# Patient Record
Sex: MALE | Race: BLACK OR AFRICAN AMERICAN | NOT HISPANIC OR LATINO | Employment: FULL TIME | URBAN - METROPOLITAN AREA
[De-identification: names, ages, dates, MRNs, and addresses within clinical notes are randomized per-mention and may not be internally consistent; named-entity substitution may affect disease eponyms.]

---

## 2018-03-05 ENCOUNTER — HOSPITAL ENCOUNTER (EMERGENCY)
Facility: CLINIC | Age: 33
Discharge: HOME | End: 2018-03-05
Attending: EMERGENCY MEDICINE | Admitting: EMERGENCY MEDICINE
Payer: COMMERCIAL

## 2018-03-05 ENCOUNTER — APPOINTMENT (OUTPATIENT)
Dept: GENERAL RADIOLOGY | Facility: CLINIC | Age: 33
End: 2018-03-05
Attending: EMERGENCY MEDICINE
Payer: COMMERCIAL

## 2018-03-05 VITALS
BODY MASS INDEX: 18.97 KG/M2 | TEMPERATURE: 96.6 F | SYSTOLIC BLOOD PRESSURE: 125 MMHG | OXYGEN SATURATION: 99 % | WEIGHT: 139.9 LBS | RESPIRATION RATE: 18 BRPM | DIASTOLIC BLOOD PRESSURE: 55 MMHG

## 2018-03-05 DIAGNOSIS — M79.671 RIGHT FOOT PAIN: ICD-10-CM

## 2018-03-05 PROCEDURE — 99283 EMERGENCY DEPT VISIT LOW MDM: CPT | Mod: Z6 | Performed by: EMERGENCY MEDICINE

## 2018-03-05 PROCEDURE — 25000132 ZZH RX MED GY IP 250 OP 250 PS 637: Performed by: EMERGENCY MEDICINE

## 2018-03-05 PROCEDURE — 99282 EMERGENCY DEPT VISIT SF MDM: CPT | Performed by: EMERGENCY MEDICINE

## 2018-03-05 PROCEDURE — 73630 X-RAY EXAM OF FOOT: CPT | Mod: RT

## 2018-03-05 RX ORDER — NAPROXEN 500 MG/1
500 TABLET ORAL 2 TIMES DAILY WITH MEALS
Qty: 16 TABLET | Refills: 0 | Status: SHIPPED | OUTPATIENT
Start: 2018-03-05 | End: 2018-03-13

## 2018-03-05 RX ORDER — IBUPROFEN 200 MG
400 TABLET ORAL ONCE
Status: COMPLETED | OUTPATIENT
Start: 2018-03-05 | End: 2018-03-05

## 2018-03-05 RX ADMIN — IBUPROFEN 400 MG: 200 TABLET, FILM COATED ORAL at 15:16

## 2018-03-05 ASSESSMENT — ENCOUNTER SYMPTOMS
WEAKNESS: 0
JOINT SWELLING: 0
FEVER: 0
NUMBNESS: 0
SHORTNESS OF BREATH: 0
ARTHRALGIAS: 0
WOUND: 0
COLOR CHANGE: 0
CHILLS: 0
ABDOMINAL PAIN: 0
MYALGIAS: 0

## 2018-03-05 NOTE — DISCHARGE INSTRUCTIONS
Please make an appointment to follow up with Orthopedics (phone: [phone]) in 3-5 days for further evaluation and recommendations, unless your symptoms completely resolve.  If your symptoms significantly worsen please come back to the emergency department for evaluation.

## 2018-03-05 NOTE — ED AVS SNAPSHOT
[facility], [place], Emergency Department    [address] [place] [address]    Phone:  [phone]    Fax:  [phone]                                       Brianda Escalante   MRN: [id number]    Department:  [facility], Emergency Department   Date of Visit:  3/5/2018           After Visit Summary Signature Page     I have received my discharge instructions, and my questions have been answered. I have discussed any challenges I see with this plan with the nurse or doctor.    ..........................................................................................................................................  Patient/Patient Representative Signature      ..........................................................................................................................................  Patient Representative Print Name and Relationship to Patient    ..................................................               ................................................  Date                                            Time    ..........................................................................................................................................  Reviewed by Signature/Title    ...................................................              ..............................................  Date                                                            Time

## 2018-08-17 NOTE — ED PROVIDER NOTES
History     Chief Complaint   Patient presents with     Leg Pain     Noticed small swelling on R foot near middle toes on Saturday. Reports mild painl, worse with weight bearing, and tender to touch.      SHERRIE Escalante is a 32 year old male who presents for evaluation of right foot pain. The patient reports that last night he developed pain in the right forefoot in the region of the 2nd and 3rd toes.  Pain is mild, throbbing, nonradiating, constant, walking makes it worse, rest improves it.  He has noted some swelling in this area of his foot.  No fevers or skin color changes in right foot.  The patient denies any injury or trauma to the foot. He states that he's had no prior injury to the foot. He is otherwise healthy. No other concerns or complaints.    No current facility-administered medications for this encounter.      Current Outpatient Prescriptions   Medication     naproxen (NAPROSYN) 500 MG tablet     hydrOXYzine (VISTARIL) 50 MG capsule     ibuprofen (ADVIL,MOTRIN) 200 MG tablet     History reviewed. No pertinent past medical history.    History reviewed. No pertinent surgical history.    No family history on file.    Social History   Substance Use Topics     Smoking status: Never Smoker     Smokeless tobacco: Never Used     Alcohol use No     Allergies   Allergen Reactions     Albumin, Egg Itching     Banana Itching     Seasonal Allergies        I have reviewed the Medications, Allergies, Past Medical and Surgical History, and Social History in the Epic system.    Review of Systems   Constitutional: Negative for chills and fever.   Respiratory: Negative for shortness of breath.    Cardiovascular: Negative for chest pain.   Gastrointestinal: Negative for abdominal pain.   Musculoskeletal: Negative for arthralgias, gait problem, joint swelling and myalgias.        Right foot pain.   Skin: Negative for color change and wound.   Allergic/Immunologic: Negative for immunocompromised state.   Neurological:  Negative for weakness and numbness.   All other systems reviewed and are negative.      Physical Exam   BP: 125/55  Heart Rate: 73  Temp: 96.6  F (35.9  C)  Resp: 18  Weight: 63.5 kg (139 lb 14.4 oz)  SpO2: 99 %      Physical Exam   Constitutional: He is oriented to person, place, and time. He appears well-developed and well-nourished. No distress.   HENT:   Head: Normocephalic and atraumatic.   Eyes: Conjunctivae and EOM are normal.   Neck: Normal range of motion.   Cardiovascular: Normal rate.    Pulses:       Dorsalis pedis pulses are 2+ on the right side, and 2+ on the left side.        Posterior tibial pulses are 2+ on the right side, and 2+ on the left side.   Pulmonary/Chest: Effort normal. No respiratory distress.   Musculoskeletal: Normal range of motion. He exhibits edema and tenderness. He exhibits no deformity.   Mild point tenderness over right, dorsal foot just proximal to second and third MTP joints.  There is very mild edema in the dorsum of the foot over the affected area.  No obvious deformity.  No tenderness over toes in the right foot.  No right midfoot tenderness.  No right ankle tenderness.  Full range of motion, active and passive, in right toes, ankle, knee, and hip without pain.  No discoloration of right foot.   Neurological: He is alert and oriented to person, place, and time. No cranial nerve deficit. He exhibits normal muscle tone. Coordination normal.   Skin: Skin is warm. No rash noted. He is not diaphoretic. No erythema.   Psychiatric: He has a normal mood and affect. His behavior is normal. Judgment and thought content normal.   Nursing note and vitals reviewed.      ED Course     ED Course     Procedures             Critical Care time:  none             Labs Ordered and Resulted from Time of ED Arrival Up to the Time of Departure from the ED - No data to display         Assessments & Plan (with Medical Decision Making)   32-year-old man presenting with right foot pain. Differential  diagnosis: sprain, fracture, contusion.     After a thorough history and physical exam the patient appears to be in no acute distress. He will be given oral ibuprofen for pain and I will obtain x-ray of the right foot for further diagnostic evaluation. He agrees with the plan.     I reviewed the patient's x-ray and I read the radiology report; there s no evidence of acute abnormality such as dislocation or fracture on the x-ray. The patient was provided with a postop shoe for comfort and a prescription for Naproxen with referral to Podiatry, and recommendations to return to the Emergency Department if his symptoms worsen. He agrees with the plan. At this point he is stable for discharge.  Gait is normal at discharge.      This part of the document was transcribed by Xiang Serra for Yony Flores MD.    I have reviewed the nursing notes.    I have reviewed the findings, diagnosis, plan and need for follow up with the patient.    Discharge Medication List as of 3/5/2018  3:44 PM      START taking these medications    Details   naproxen (NAPROSYN) 500 MG tablet Take 1 tablet (500 mg) by mouth 2 times daily (with meals) for 8 days, Disp-16 tablet, R-0, Local Print             Final diagnoses:   Right foot pain     IXiang, am serving as a trained medical scribe to document services personally performed by Yony Flores MD, based on the provider's statements to me.      Yony GARDNER MD, was physically present and have reviewed and verified the accuracy of this note documented by Xiang Serra.    3/5/2018   [facility], [place], EMERGENCY DEPARTMENT     Yony Flores MD  03/05/18 3590     No

## 2020-12-12 ENCOUNTER — HOSPITAL ENCOUNTER (EMERGENCY)
Facility: CLINIC | Age: 35
Discharge: HOME | End: 2020-12-12
Attending: EMERGENCY MEDICINE | Admitting: EMERGENCY MEDICINE
Payer: COMMERCIAL

## 2020-12-12 VITALS
HEART RATE: 69 BPM | TEMPERATURE: 47.7 F | RESPIRATION RATE: 16 BRPM | OXYGEN SATURATION: 97 % | WEIGHT: 155 LBS | SYSTOLIC BLOOD PRESSURE: 109 MMHG | BODY MASS INDEX: 23.49 KG/M2 | HEIGHT: 68 IN | DIASTOLIC BLOOD PRESSURE: 79 MMHG

## 2020-12-12 DIAGNOSIS — K13.79 BLEEDING IN MOUTH: ICD-10-CM

## 2020-12-12 DIAGNOSIS — K91.840 HEMORRHAGE FOLLOWING LIVER BIOPSY: ICD-10-CM

## 2020-12-12 DIAGNOSIS — Z98.818 STATUS POST WISDOM TOOTH EXTRACTION: ICD-10-CM

## 2020-12-12 PROCEDURE — 99283 EMERGENCY DEPT VISIT LOW MDM: CPT | Mod: 25 | Performed by: EMERGENCY MEDICINE

## 2020-12-12 PROCEDURE — 99283 EMERGENCY DEPT VISIT LOW MDM: CPT | Mod: 25

## 2020-12-12 PROCEDURE — D9930: HCPCS

## 2020-12-12 PROCEDURE — 250N000009 HC RX 250: Performed by: EMERGENCY MEDICINE

## 2020-12-12 PROCEDURE — D9930: HCPCS | Performed by: EMERGENCY MEDICINE

## 2020-12-12 RX ORDER — TRANEXAMIC ACID 100 MG/ML
INJECTION, SOLUTION INTRAVENOUS ONCE
Status: COMPLETED | OUTPATIENT
Start: 2020-12-12 | End: 2020-12-12

## 2020-12-12 RX ORDER — AMOXICILLIN 500 MG/1
500 CAPSULE ORAL 2 TIMES DAILY
COMMUNITY

## 2020-12-12 RX ADMIN — THROMBIN, TOPICAL (BOVINE) 5000 UNITS: KIT at 08:20

## 2020-12-12 RX ADMIN — TRANEXAMIC ACID 1000 MG: 100 INJECTION, SOLUTION INTRAVENOUS at 07:31

## 2020-12-12 ASSESSMENT — MIFFLIN-ST. JEOR: SCORE: 1612.58

## 2020-12-12 NOTE — DISCHARGE INSTRUCTIONS
Please call your dentist on Monday for follow up. Return to the emergency department if bleeding continues.

## 2020-12-12 NOTE — ED PROVIDER NOTES
"    [facility] EMERGENCY DEPARTMENT ([facility])     December 12, 2020    ED Provider Note  [facility]      History     Chief Complaint   Patient presents with     Mouth/Lip Problem     patient had a dental extraction 4 days ago, last night patient noticed constant bleeding on the surgical site since 11 PM     HPI  Brianda Escalante is a 35 year old male who presents for mouth bleeding. Patient reports that he had a wisdom tooth extraction 4 days ago, uncomplicated. Last night, he noticed increased constant bleeding from the extraction site. Bleeding has persisted throughout the night. He has not been able to contact his dentist due to the time of day. Denies history of bleeding disorder. No other bruising or bleeding noted. No trauma.     Past Medical History  History reviewed. No pertinent past medical history.  History reviewed. No pertinent surgical history.       amoxicillin (AMOXIL) 500 MG capsule       ibuprofen (ADVIL,MOTRIN) 200 MG tablet       hydrOXYzine (VISTARIL) 50 MG capsule      Allergies   Allergen Reactions     Albumin, Egg Itching     Banana Itching     Seasonal Allergies      Family History  History reviewed. No pertinent family history.  Social History   Social History     Tobacco Use     Smoking status: Never Smoker     Smokeless tobacco: Never Used   Substance Use Topics     Alcohol use: No     Drug use: No      Past medical history, past surgical history, medications, allergies, family history, and social history were reviewed with the patient. No additional pertinent items.       Review of Systems  A complete review of systems was performed with pertinent positives and negatives noted in the HPI, and all other systems negative.    Physical Exam   BP: 113/83  Pulse: 77  Temp: 98.2  F (36.8  C)  Resp: 18  Height: 172.7 cm (5' 8\")  Weight: 70.3 kg (155 lb)  SpO2: 99 %  Physical Exam  Vitals signs and nursing note reviewed.   Constitutional:       General: He is not in " acute distress.     Appearance: Normal appearance. He is not diaphoretic.   HENT:      Head: Normocephalic and atraumatic.      Mouth/Throat:     Eyes:      General: No scleral icterus.     Pupils: Pupils are equal, round, and reactive to light.   Neck:      Musculoskeletal: Neck supple.   Cardiovascular:      Rate and Rhythm: Normal rate.   Pulmonary:      Effort: Pulmonary effort is normal. No respiratory distress.   Abdominal:      General: Bowel sounds are normal.      Palpations: Abdomen is soft.      Tenderness: There is no abdominal tenderness.   Musculoskeletal:         General: No tenderness.   Skin:     General: Skin is warm.      Findings: No rash.   Neurological:      General: No focal deficit present.      Mental Status: He is alert and oriented to person, place, and time.   Psychiatric:         Mood and Affect: Mood normal.         Behavior: Behavior normal.         ED Course      Procedures       No results found for any visits on 12/12/20.  Medications   tranexamic acid (CYKLOKAPRON) TOPICAL (injection used topically) (1,000 mg Topical Given by Other Clinician 12/12/20 5096)   thrombin 5000 units EPISTAXIS kit (5,000 Units Topical Given by Other Clinician 12/12/20 8996)        Assessments & Plan (with Medical Decision Making)   Patient was seen and examined.  Initial trial of topical TXA was unsuccessful at stopping the bleeding.  Surgicel was soaked in topical thrombin and packed into the area of bleeding.  Patient was discharged with this in place.  He was instructed to leave this in place for as long as possible, at least 4 hours.  He should be extremely careful with chewing on this side as this would likely restart bleeding.  He is going to call his dentist as soon as possible for follow-up.  Return to the emergency department for significant rebleeding or other concerns.  He is comfortable with this plan and was discharged in stable condition.    I have reviewed the nursing notes. I have  reviewed the findings, diagnosis, plan and need for follow up with the patient.    Discharge Medication List as of 12/12/2020  8:46 AM          Final diagnoses:   Status post wisdom tooth extraction   Bleeding in mouth       --  Venessa WRIGHT [facility] EMERGENCY DEPARTMENT  12/12/2020     Venessa Telles DO  12/12/20 0856

## 2020-12-12 NOTE — ED AVS SNAPSHOT
[facility] Emergency Department  [address]  [place] [address]  Phone: [phone]  Fax: [phone]                                    Brianda Escalante   MRN: [id number]    Department: [facility] Emergency Department   Date of Visit: 12/12/2020           After Visit Summary Signature Page    I have received my discharge instructions, and my questions have been answered. I have discussed any challenges I see with this plan with the nurse or doctor.    ..........................................................................................................................................  Patient/Patient Representative Signature      ..........................................................................................................................................  Patient Representative Print Name and Relationship to Patient    ..................................................               ................................................  Date                                   Time    ..........................................................................................................................................  Reviewed by Signature/Title    ...................................................              ..............................................  Date                                               Time          22EPIC Rev 08/18

## 2023-09-05 ENCOUNTER — OFFICE VISIT (OUTPATIENT)
Dept: URGENT CARE | Facility: URGENT CARE | Age: 38
End: 2023-09-05
Payer: COMMERCIAL

## 2023-09-05 VITALS
DIASTOLIC BLOOD PRESSURE: 85 MMHG | TEMPERATURE: 97.4 F | BODY MASS INDEX: 23.42 KG/M2 | SYSTOLIC BLOOD PRESSURE: 120 MMHG | OXYGEN SATURATION: 98 % | WEIGHT: 154 LBS | HEART RATE: 60 BPM

## 2023-09-05 DIAGNOSIS — J30.2 SEASONAL ALLERGIC RHINITIS, UNSPECIFIED TRIGGER: ICD-10-CM

## 2023-09-05 DIAGNOSIS — R19.7 DIARRHEA, UNSPECIFIED TYPE: Primary | ICD-10-CM

## 2023-09-05 PROCEDURE — 99203 OFFICE O/P NEW LOW 30 MIN: CPT | Performed by: FAMILY MEDICINE

## 2023-09-05 PROCEDURE — 87507 IADNA-DNA/RNA PROBE TQ 12-25: CPT | Performed by: FAMILY MEDICINE

## 2023-09-05 RX ORDER — CETIRIZINE HYDROCHLORIDE 10 MG/1
10 TABLET ORAL DAILY
Qty: 30 TABLET | Refills: 0 | Status: SHIPPED | OUTPATIENT
Start: 2023-09-05 | End: 2023-10-05

## 2023-09-05 NOTE — PROGRESS NOTES
SUBJECTIVE: Brianda Escalante is a 38 year old male presenting with a chief complaint of diarrhea and seasonal allergies.  Onset of symptoms was day(s) ago.  Course of illness is same.    Recent foreigh travel    No past medical history on file.  Allergies   Allergen Reactions    Banana Itching    Box Elder Trees Unknown    Egg White (Egg Protein) Itching    Seasonal Allergies      Social History     Tobacco Use    Smoking status: Never    Smokeless tobacco: Never   Substance Use Topics    Alcohol use: No       ROS:  SKIN: no rash  GI: no vomiting    OBJECTIVE:  /85   Pulse 60   Temp 97.4  F (36.3  C) (Tympanic)   Wt 69.9 kg (154 lb)   SpO2 98%   BMI 23.42 kg/m  GENERAL APPEARANCE: healthy, alert and no distress  EYES: EOMI,  PERRL, conjunctiva clear  HENT: ear canals and TM's normal.  Nose and mouth without ulcers, erythema or lesions  RESP: lungs clear to auscultation - no rales, rhonchi or wheezes  ABDOMEN:  soft, nontender  SKIN: no suspicious lesions or rashes      ICD-10-CM    1. Diarrhea, unspecified type  R19.7 Enteric Bacteria and Virus Panel by [facility]ETA Stool      2. Seasonal allergic rhinitis, unspecified trigger  J30.2 cetirizine (ZYRTEC) 10 MG tablet          Fluids/Rest, f/u if worse/not any better

## 2023-09-07 ENCOUNTER — TELEPHONE (OUTPATIENT)
Dept: INTERNAL MEDICINE | Facility: CLINIC | Age: 38
End: 2023-09-07
Payer: COMMERCIAL

## 2023-09-07 NOTE — TELEPHONE ENCOUNTER
CC: Patient went to urgent care on 9/5/23 and is calling back regarding below results:        Writer reviewed above note with patient.     Patient states he feels that symptoms are slightly improving over the last week but still experiencing frequent diarrhea after eating. Routing back to [facility] provider to please review and advise if patient should be treated or monitor to see if symptoms will continue to improve?    Please route back to [facility] to follow up with patient - thank you!     Patient's pharmacy:     [id number] [place] [facility] [place] - [address] [place] [address]     Callback to patient: [phone]    Angelina Pineda RN  [facility]

## 2023-09-08 NOTE — TELEPHONE ENCOUNTER
I called and spoke with the patient he reports his diarrhea is lessening each day.  Reviewed proper diet for diarrhea.  He has been eating a lot of dairy which I told him to avoid.  If he is still having diarrhea in 5 days he will see primary care provider or come into the urgent care for a recheck.  He is encouraged to keep himself hydrated and given ideas for of things he can drink to replace the fluid and electrolyte losses from the diarrhea.  Explained to patient that these infections usually run their course and there is no need for antibiotic treatment.  If he is still having diarrhea in 5 days and its not yet improving more may consider Cipro at that point.

## 2023-09-16 LAB
ADV 40+41 DNA STL QL NAA+NON-PROBE: NEGATIVE
ASTRO TYP 1-8 RNA STL QL NAA+NON-PROBE: NEGATIVE
C CAYETANENSIS DNA STL QL NAA+NON-PROBE: NEGATIVE
CAMPYLOBACTER DNA SPEC NAA+PROBE: NEGATIVE
CRYPTOSP DNA STL QL NAA+NON-PROBE: NEGATIVE
E COLI O157 DNA STL QL NAA+NON-PROBE: ABNORMAL
E HISTOLYT DNA STL QL NAA+NON-PROBE: NEGATIVE
EAEC ASTA GENE ISLT QL NAA+PROBE: POSITIVE
EC STX1+STX2 GENES STL QL NAA+NON-PROBE: NEGATIVE
EPEC EAE GENE STL QL NAA+NON-PROBE: POSITIVE
ETEC LTA+ST1A+ST1B TOX ST NAA+NON-PROBE: NEGATIVE
G LAMBLIA DNA STL QL NAA+NON-PROBE: NEGATIVE
NOROVIRUS GI+II RNA STL QL NAA+NON-PROBE: NEGATIVE
P SHIGELLOIDES DNA STL QL NAA+NON-PROBE: NEGATIVE
RVA RNA STL QL NAA+NON-PROBE: NEGATIVE
SALMONELLA SP RPOD STL QL NAA+PROBE: NEGATIVE
SAPO I+II+IV+V RNA STL QL NAA+NON-PROBE: NEGATIVE
SHIGELLA SP+EIEC IPAH ST NAA+NON-PROBE: POSITIVE
V CHOLERAE DNA SPEC QL NAA+PROBE: NEGATIVE
VIBRIO DNA SPEC NAA+PROBE: NEGATIVE
Y ENTEROCOL DNA STL QL NAA+PROBE: NEGATIVE

## 2024-03-15 ENCOUNTER — OFFICE VISIT (OUTPATIENT)
Dept: URGENT CARE | Facility: URGENT CARE | Age: 39
End: 2024-03-15
Payer: COMMERCIAL

## 2024-03-15 VITALS
RESPIRATION RATE: 16 BRPM | OXYGEN SATURATION: 100 % | SYSTOLIC BLOOD PRESSURE: 114 MMHG | TEMPERATURE: 98 F | HEART RATE: 73 BPM | DIASTOLIC BLOOD PRESSURE: 82 MMHG

## 2024-03-15 DIAGNOSIS — R11.0 NAUSEA: ICD-10-CM

## 2024-03-15 DIAGNOSIS — R11.2 NAUSEA AND VOMITING, UNSPECIFIED VOMITING TYPE: ICD-10-CM

## 2024-03-15 DIAGNOSIS — R07.0 THROAT PAIN: Primary | ICD-10-CM

## 2024-03-15 LAB
DEPRECATED S PYO AG THROAT QL EIA: NEGATIVE
GROUP A STREP BY PCR: NOT DETECTED

## 2024-03-15 PROCEDURE — 87651 STREP A DNA AMP PROBE: CPT | Performed by: FAMILY MEDICINE

## 2024-03-15 PROCEDURE — 99213 OFFICE O/P EST LOW 20 MIN: CPT | Performed by: FAMILY MEDICINE

## 2024-03-15 RX ORDER — ONDANSETRON 4 MG/1
4 TABLET, ORALLY DISINTEGRATING ORAL EVERY 8 HOURS PRN
Qty: 6 TABLET | Refills: 0 | Status: SHIPPED | OUTPATIENT
Start: 2024-03-15 | End: 2024-03-17

## 2024-03-15 RX ORDER — IBUPROFEN 600 MG/1
600 TABLET, FILM COATED ORAL EVERY 8 HOURS PRN
Qty: 15 TABLET | Refills: 0 | Status: SHIPPED | OUTPATIENT
Start: 2024-03-15 | End: 2024-03-20

## 2024-03-15 NOTE — PROGRESS NOTES
SUBJECTIVE: Brianda Escalante is a 38 year old male presenting with a chief complaint of sore throat and n/v.  Onset of symptoms was 2 day(s) ago.    No past medical history on file.  Allergies   Allergen Reactions    Banana Itching    Box Elder Trees Unknown    Egg White (Egg Protein) Itching    Seasonal Allergies      Social History     Tobacco Use    Smoking status: Never    Smokeless tobacco: Never   Substance Use Topics    Alcohol use: No       ROS:  SKIN: no rash  GI: no vomiting    OBJECTIVE:  /82   Pulse 73   Temp 98  F (36.7  C) (Tympanic)   Resp 16   SpO2 100% GENERAL APPEARANCE: healthy, alert and no distress  EYES: EOMI,  PERRL, conjunctiva clear  HENT: ear canals and TM's normal.  Nose and mouth without ulcers, erythema or lesions  RESP: lungs clear to auscultation - no rales, rhonchi or wheezes  ABDOMEN:  soft, nontender, no HSM or masses and bowel sounds normal  SKIN: no suspicious lesions or rashes      ICD-10-CM    1. Throat pain  R07.0 Streptococcus A Rapid Screen w/Reflex to PCR     Group A Streptococcus PCR Throat Swab     ibuprofen (ADVIL/MOTRIN) 600 MG tablet      2. Nausea  R11.0 ondansetron (ZOFRAN ODT) 4 MG ODT tab      3. Nausea and vomiting, unspecified vomiting type  R11.2 ondansetron (ZOFRAN ODT) 4 MG ODT tab          Fluids/Rest, f/u if worse/not any better

## 2024-05-21 ENCOUNTER — TRANSCRIBE ORDERS (OUTPATIENT)
Dept: OTHER | Age: 39
End: 2024-05-21

## 2024-05-21 DIAGNOSIS — R13.10 DYSPHAGIA, UNSPECIFIED TYPE: ICD-10-CM

## 2024-05-21 DIAGNOSIS — R07.89 ATYPICAL CHEST PAIN: Primary | ICD-10-CM

## 2024-06-13 ENCOUNTER — THERAPY VISIT (OUTPATIENT)
Dept: SPEECH THERAPY | Facility: CLINIC | Age: 39
End: 2024-06-13
Attending: PHYSICIAN ASSISTANT
Payer: COMMERCIAL

## 2024-06-13 ENCOUNTER — ANCILLARY PROCEDURE (OUTPATIENT)
Dept: GENERAL RADIOLOGY | Facility: CLINIC | Age: 39
End: 2024-06-13
Attending: PHYSICIAN ASSISTANT
Payer: COMMERCIAL

## 2024-06-13 DIAGNOSIS — R13.12 DYSPHAGIA, OROPHARYNGEAL PHASE: Primary | ICD-10-CM

## 2024-06-13 DIAGNOSIS — R13.10 DYSPHAGIA, UNSPECIFIED TYPE: ICD-10-CM

## 2024-06-13 DIAGNOSIS — R07.89 ATYPICAL CHEST PAIN: ICD-10-CM

## 2024-06-13 PROCEDURE — 74230 X-RAY XM SWLNG FUNCJ C+: CPT | Mod: GC | Performed by: RADIOLOGY

## 2024-06-13 PROCEDURE — 92611 MOTION FLUOROSCOPY/SWALLOW: CPT | Mod: GN | Performed by: SPEECH-LANGUAGE PATHOLOGIST

## 2024-06-13 RX ORDER — BARIUM SULFATE 400 MG/ML
SUSPENSION ORAL ONCE
Status: COMPLETED | OUTPATIENT
Start: 2024-06-13 | End: 2024-06-13

## 2024-06-13 RX ADMIN — BARIUM SULFATE: 400 SUSPENSION ORAL at 15:20

## 2024-06-13 NOTE — PROGRESS NOTES
SPEECH LANGUAGE PATHOLOGY EVALUATION       Fall Risk Screen:  Fall screen completed by: SLP  Have you fallen 2 or more times in the past year?: No  Have you fallen and had an injury in the past year?: No  Is patient a fall risk?: No    Subjective      Presenting condition or subjective complaint:  Pt was seen today for a video swallow study. Pt reports that he feels a lump in the throat. He is dry in the throat all the time. Pt reports increased pressure in his throat. No coughing when eating. No weight loss. Not avoiding any pills. Pills go down ok. Ongoing for a couple of months. Pt does endorse throat clearing throughout the day. Occasional sore throat in the morning.   Date of onset:      Relevant medical history:     Dates & types of surgery:      Prior diagnostic imaging/testing results:       Prior therapy history for the same diagnosis, illness or injury:        Living Environment  Social support:     Help at home:    Equipment owned:       Employment:      Hobbies/Interests:      Patient goals for therapy:      Pain assessment: Pain denied     Objective     SWALLOW EVALUTION  Dysphagia history:  No history of dysphagia  Current Diet/Method of Nutritional Intake: thin liquids (level 0), regular diet        VIDEOFLUOROSCOPIC SWALLOW STUDY  Radiologist: Resident  Views Taken: left lateral, A/P   Physical location of procedure: [facility]  Patient sitting upright on chair/stool     VFSS textures trialed:   VFSS Eval: Thin Liquids  Mode of Presentation: cup, self-fed   Order of Presentation: 1, 2, 9, 10, 13 (AP)  Preparatory Phase: WFL  Oral Phase: residue in oral cavity  Bolus Location When Swallow Initiated: posterior angle of ramus  Pharyngeal Phase: WFL  Rosenbeck's Penetration Aspiration Scale: 1 - no aspiration, contrast does not enter airway  Response to Aspiration:  none  Strategies and Compensations:   Diagnostic Statement: No penetration, no aspiration. Timely swallow response. Noted residue in  oral cavity.     VFSS Eval: Mildly Thick Liquids  Mode of Presentation: cup, self-fed   Order of Presentation: 3  Preparatory Phase: WFL  Oral Phase: residue in oral cavity  Bolus Location When Swallow Initiated: posterior angle of ramus  Pharyngeal Phase: WFL  Rosenbeck's Penetration Aspiration Scale: 1 - no aspiration, contrast does not enter airway  Response to Aspiration:   Strategies and Compensations:   Diagnostic Statement: No penetration, no aspiration. Timely swallow response. Noted residue in oral cavity.     VFSS Eval: Purees  Mode of Presentation: spoon, self-fed   Order of Presentation: 4, 5 (pointing), 6 (pointing),7,  12 (AP)  Preparatory Phase: WFL  Oral Phase: WFL  Bolus Location When Swallow Initiated: posterior angle of ramus  Pharyngeal Phase: WFL  Rosenbeck s Penetration Aspiration Scale: 1 - no aspiration, contrast does not enter airway  Response to Aspiration:   Strategies and Compensations:   Diagnostic Statement: No penetration, no aspiration. Timely swallow response. Noted residue in oral cavity.     VFSS Eval: Solids  Mode of Presentation: self-fed   Order of Presentation: 8, 9  Preparatory Phase: WFL  Oral Phase: WFL  Bolus Location When Swallow Initiated: posterior angle of ramus  Pharyngeal Phase: WFL   Rosenbeck s Penetration Aspiration Scale: 1 - no aspiration, contrast does not enter airway  Response to Aspiration:   Strategies and Compensations:   Diagnostic Statement: No penetration, no aspiration. Timely swallow response. Noted residue in oral cavity.     VFSS Eval: Barium Tablet  Mode of Presentation: cup   Order of Presentation: 11  Diagnostic Statement: Barium tablet briefly stuck on tongue. Cleared with multiple sips of thin liquids.      ESOPHAGEAL PHASE OF SWALLOW  no observed or reported concerns related to esophageal function     SWALLOW ASSESSMENT CLINICAL IMPRESSIONS AND RATIONALE  Diet Consistency Recommendations: thin liquids (level 0), regular diet    Recommended  Feeding/Eating Techniques: alternate food and liquid intake   Medication Administration Recommendations: pills with water  Instrumental Assessment Recommendations: instrumental evaluation not recommended at this time     Assessment & Plan   CLINICAL IMPRESSIONS   Medical Diagnosis:      Treatment Diagnosis:     Impression/Assessment: Pt is a 38 year old male with swallowing complaints. The following significant findings have been identified:  safe, functional oropharyngeal swallow , characterized by timely swallow response, adequate hyolaryngeal elevation and excursion. No residue remained in the pharynx indicating adequate strength. Recommend Pt continue with a regular diet with thin liquids. Recommend consult with GI to rule out esophageal component or ENT to further assess causes to dry mouth and possible muscle tension dysphagia.     PLAN OF CARE  Treatment Interventions:  Evaluation only    Recommended Referrals to Other Professionals:   Education Assessment:        Risks and benefits of evaluation/treatment have been explained.   Patient/Family/caregiver agrees with Plan of Care.     Evaluation Time:              Signing Clinician: Wendy WRIGHT [facility]                                                                                   OUTPATIENT SPEECH LANGUAGE PATHOLOGY

## 2025-01-21 ENCOUNTER — OFFICE VISIT (OUTPATIENT)
Dept: URGENT CARE | Facility: URGENT CARE | Age: 40
End: 2025-01-21
Payer: COMMERCIAL

## 2025-01-21 VITALS
TEMPERATURE: 97.7 F | BODY MASS INDEX: 23.26 KG/M2 | WEIGHT: 153 LBS | DIASTOLIC BLOOD PRESSURE: 74 MMHG | HEART RATE: 62 BPM | SYSTOLIC BLOOD PRESSURE: 115 MMHG | OXYGEN SATURATION: 97 %

## 2025-01-21 DIAGNOSIS — R19.7 DIARRHEA, UNSPECIFIED TYPE: Primary | ICD-10-CM

## 2025-01-21 DIAGNOSIS — A09 DIARRHEA, TRAVELERS': ICD-10-CM

## 2025-01-21 PROCEDURE — 99213 OFFICE O/P EST LOW 20 MIN: CPT | Performed by: FAMILY MEDICINE

## 2025-01-21 NOTE — PROGRESS NOTES
SUBJECTIVE: Brianda Escalante is a 39 year old male presenting with a chief complaint of clinton now with diarrhea.      No past medical history on file.  Allergies   Allergen Reactions    Banana Itching    Box Elder Trees Unknown    Egg White (Egg Protein) Itching    Seasonal Allergies      Social History     Tobacco Use    Smoking status: Never    Smokeless tobacco: Never   Substance Use Topics    Alcohol use: No       ROS:  SKIN: no rash  GI: no vomiting    OBJECTIVE:  /74 (BP Location: Right arm, Patient Position: Sitting, Cuff Size: Adult Regular)   Pulse 62   Temp 97.7  F (36.5  C) (Tympanic)   Wt 69.4 kg (153 lb)   SpO2 97%   BMI 23.26 kg/m  GENERAL APPEARANCE: healthy, alert and no distress  ABDOMEN: hyperactive bowel sounds, nt  SKIN: no suspicious lesions or rashes      ICD-10-CM    1. Diarrhea, unspecified type  R19.7 Enteric Bacteria and Virus Panel by [facility] Stool      2. Diarrhea, travelers'  A09 Enteric Bacteria and Virus Panel by NEETA Stool          Fluids/Rest, f/u if worse/not any better

## 2025-01-22 PROCEDURE — 87507 IADNA-DNA/RNA PROBE TQ 12-25: CPT | Performed by: FAMILY MEDICINE

## 2025-01-23 ENCOUNTER — APPOINTMENT (OUTPATIENT)
Dept: INTERPRETER SERVICES | Facility: CLINIC | Age: 40
End: 2025-01-23
Payer: COMMERCIAL

## 2025-01-23 DIAGNOSIS — A07.2: Primary | ICD-10-CM

## 2025-01-23 LAB

## 2025-01-23 RX ORDER — NITAZOXANIDE 500 MG/1
500 TABLET ORAL 2 TIMES DAILY WITH MEALS
Qty: 6 TABLET | Refills: 0 | Status: SHIPPED | OUTPATIENT
Start: 2025-01-23 | End: 2025-01-26

## 2025-01-23 NOTE — LETTER
January 23, 2025      Awes Boris  [address]  [facility] [place] [facility] [place] [address]        Dear Brianda,     We have been unable to reach you by phone.  Your  stool cultures came back positive for a bacteria called cryptosporidia.  Because you have had the diarrhea for so long it is recommended that you take the medicine Nitoazoxinide which I did send it to your pharmacy, [facility] [id number] [place] [facility], [address]. if your diarrhea has resolved no need to take this medication but if it has continued is important to take this twice a day for 3 days.  We would further recommend that you be tested for HIV which is a blood test.  This can be done at the urgent care or through your primary care provider.      Sincerely,        ABY KELLEY CNP    Electronically signed

## 2025-01-23 NOTE — PROGRESS NOTES
Treatment for Cryptosporidium diarrhea per Dr. Potter from infectious disease. Letter sent to patient as he is unable to be reached by phone.

## 2025-02-27 DIAGNOSIS — A07.2: Primary | ICD-10-CM

## 2025-02-27 RX ORDER — NITAZOXANIDE 500 MG/1
500 TABLET ORAL 2 TIMES DAILY WITH MEALS
Qty: 6 TABLET | Refills: 0 | Status: SHIPPED | OUTPATIENT
Start: 2025-02-27 | End: 2025-03-02

## 2025-02-27 NOTE — PROGRESS NOTES
Patient presented to clinic today requesting a written prescription for the nitazoxanide as he has not been able to get it from his pharmacy.